# Patient Record
Sex: FEMALE | Race: ASIAN | NOT HISPANIC OR LATINO | URBAN - METROPOLITAN AREA
[De-identification: names, ages, dates, MRNs, and addresses within clinical notes are randomized per-mention and may not be internally consistent; named-entity substitution may affect disease eponyms.]

---

## 2021-11-26 ENCOUNTER — EMERGENCY (EMERGENCY)
Facility: HOSPITAL | Age: 61
LOS: 1 days | Discharge: HOME | End: 2021-11-26
Attending: EMERGENCY MEDICINE | Admitting: EMERGENCY MEDICINE
Payer: COMMERCIAL

## 2021-11-26 VITALS
RESPIRATION RATE: 19 BRPM | DIASTOLIC BLOOD PRESSURE: 78 MMHG | OXYGEN SATURATION: 96 % | HEART RATE: 84 BPM | SYSTOLIC BLOOD PRESSURE: 136 MMHG

## 2021-11-26 VITALS
HEART RATE: 80 BPM | TEMPERATURE: 99 F | WEIGHT: 175.05 LBS | DIASTOLIC BLOOD PRESSURE: 84 MMHG | OXYGEN SATURATION: 95 % | RESPIRATION RATE: 18 BRPM | SYSTOLIC BLOOD PRESSURE: 140 MMHG | HEIGHT: 62 IN

## 2021-11-26 LAB
ALBUMIN SERPL ELPH-MCNC: 3.8 G/DL — SIGNIFICANT CHANGE UP (ref 3.3–5)
ALP SERPL-CCNC: 82 U/L — SIGNIFICANT CHANGE UP (ref 40–120)
ALT FLD-CCNC: 31 U/L — SIGNIFICANT CHANGE UP (ref 12–78)
ANION GAP SERPL CALC-SCNC: 3 MMOL/L — LOW (ref 5–17)
APPEARANCE UR: CLEAR — SIGNIFICANT CHANGE UP
AST SERPL-CCNC: 14 U/L — LOW (ref 15–37)
BASOPHILS # BLD AUTO: 0.07 K/UL — SIGNIFICANT CHANGE UP (ref 0–0.2)
BASOPHILS NFR BLD AUTO: 0.8 % — SIGNIFICANT CHANGE UP (ref 0–2)
BILIRUB SERPL-MCNC: 0.6 MG/DL — SIGNIFICANT CHANGE UP (ref 0.2–1.2)
BILIRUB UR-MCNC: NEGATIVE — SIGNIFICANT CHANGE UP
BUN SERPL-MCNC: 10 MG/DL — SIGNIFICANT CHANGE UP (ref 7–23)
CALCIUM SERPL-MCNC: 10.4 MG/DL — HIGH (ref 8.5–10.1)
CHLORIDE SERPL-SCNC: 111 MMOL/L — HIGH (ref 96–108)
CO2 SERPL-SCNC: 29 MMOL/L — SIGNIFICANT CHANGE UP (ref 22–31)
COLOR SPEC: YELLOW — SIGNIFICANT CHANGE UP
CREAT SERPL-MCNC: 0.77 MG/DL — SIGNIFICANT CHANGE UP (ref 0.5–1.3)
DIFF PNL FLD: NEGATIVE — SIGNIFICANT CHANGE UP
EOSINOPHIL # BLD AUTO: 0.18 K/UL — SIGNIFICANT CHANGE UP (ref 0–0.5)
EOSINOPHIL NFR BLD AUTO: 2 % — SIGNIFICANT CHANGE UP (ref 0–6)
GLUCOSE SERPL-MCNC: 101 MG/DL — HIGH (ref 70–99)
GLUCOSE UR QL: NEGATIVE — SIGNIFICANT CHANGE UP
HCT VFR BLD CALC: 43.8 % — SIGNIFICANT CHANGE UP (ref 34.5–45)
HGB BLD-MCNC: 14.8 G/DL — SIGNIFICANT CHANGE UP (ref 11.5–15.5)
IMM GRANULOCYTES NFR BLD AUTO: 0.8 % — SIGNIFICANT CHANGE UP (ref 0–1.5)
KETONES UR-MCNC: NEGATIVE — SIGNIFICANT CHANGE UP
LEUKOCYTE ESTERASE UR-ACNC: NEGATIVE — SIGNIFICANT CHANGE UP
LYMPHOCYTES # BLD AUTO: 2.64 K/UL — SIGNIFICANT CHANGE UP (ref 1–3.3)
LYMPHOCYTES # BLD AUTO: 29.5 % — SIGNIFICANT CHANGE UP (ref 13–44)
MCHC RBC-ENTMCNC: 30.3 PG — SIGNIFICANT CHANGE UP (ref 27–34)
MCHC RBC-ENTMCNC: 33.8 GM/DL — SIGNIFICANT CHANGE UP (ref 32–36)
MCV RBC AUTO: 89.8 FL — SIGNIFICANT CHANGE UP (ref 80–100)
MONOCYTES # BLD AUTO: 0.54 K/UL — SIGNIFICANT CHANGE UP (ref 0–0.9)
MONOCYTES NFR BLD AUTO: 6 % — SIGNIFICANT CHANGE UP (ref 2–14)
NEUTROPHILS # BLD AUTO: 5.45 K/UL — SIGNIFICANT CHANGE UP (ref 1.8–7.4)
NEUTROPHILS NFR BLD AUTO: 60.9 % — SIGNIFICANT CHANGE UP (ref 43–77)
NITRITE UR-MCNC: NEGATIVE — SIGNIFICANT CHANGE UP
NRBC # BLD: 0 /100 WBCS — SIGNIFICANT CHANGE UP (ref 0–0)
PH UR: 8 — SIGNIFICANT CHANGE UP (ref 5–8)
PLATELET # BLD AUTO: 314 K/UL — SIGNIFICANT CHANGE UP (ref 150–400)
POTASSIUM SERPL-MCNC: 3.8 MMOL/L — SIGNIFICANT CHANGE UP (ref 3.5–5.3)
POTASSIUM SERPL-SCNC: 3.8 MMOL/L — SIGNIFICANT CHANGE UP (ref 3.5–5.3)
PROT SERPL-MCNC: 7.3 G/DL — SIGNIFICANT CHANGE UP (ref 6–8.3)
PROT UR-MCNC: NEGATIVE — SIGNIFICANT CHANGE UP
RBC # BLD: 4.88 M/UL — SIGNIFICANT CHANGE UP (ref 3.8–5.2)
RBC # FLD: 13.6 % — SIGNIFICANT CHANGE UP (ref 10.3–14.5)
SODIUM SERPL-SCNC: 143 MMOL/L — SIGNIFICANT CHANGE UP (ref 135–145)
SP GR SPEC: 1.01 — SIGNIFICANT CHANGE UP (ref 1.01–1.02)
UROBILINOGEN FLD QL: NEGATIVE — SIGNIFICANT CHANGE UP
WBC # BLD: 8.95 K/UL — SIGNIFICANT CHANGE UP (ref 3.8–10.5)
WBC # FLD AUTO: 8.95 K/UL — SIGNIFICANT CHANGE UP (ref 3.8–10.5)

## 2021-11-26 PROCEDURE — 87086 URINE CULTURE/COLONY COUNT: CPT

## 2021-11-26 PROCEDURE — 81003 URINALYSIS AUTO W/O SCOPE: CPT

## 2021-11-26 PROCEDURE — 72110 X-RAY EXAM L-2 SPINE 4/>VWS: CPT

## 2021-11-26 PROCEDURE — 72110 X-RAY EXAM L-2 SPINE 4/>VWS: CPT | Mod: 26

## 2021-11-26 PROCEDURE — 96374 THER/PROPH/DIAG INJ IV PUSH: CPT

## 2021-11-26 PROCEDURE — 80053 COMPREHEN METABOLIC PANEL: CPT

## 2021-11-26 PROCEDURE — 99284 EMERGENCY DEPT VISIT MOD MDM: CPT | Mod: 25

## 2021-11-26 PROCEDURE — 85025 COMPLETE CBC W/AUTO DIFF WBC: CPT

## 2021-11-26 PROCEDURE — 96361 HYDRATE IV INFUSION ADD-ON: CPT

## 2021-11-26 PROCEDURE — 99284 EMERGENCY DEPT VISIT MOD MDM: CPT

## 2021-11-26 PROCEDURE — 36415 COLL VENOUS BLD VENIPUNCTURE: CPT

## 2021-11-26 RX ORDER — LIDOCAINE 4 G/100G
1 CREAM TOPICAL ONCE
Refills: 0 | Status: COMPLETED | OUTPATIENT
Start: 2021-11-26 | End: 2021-11-26

## 2021-11-26 RX ORDER — SODIUM CHLORIDE 9 MG/ML
1000 INJECTION INTRAMUSCULAR; INTRAVENOUS; SUBCUTANEOUS ONCE
Refills: 0 | Status: COMPLETED | OUTPATIENT
Start: 2021-11-26 | End: 2021-11-26

## 2021-11-26 RX ORDER — KETOROLAC TROMETHAMINE 30 MG/ML
30 SYRINGE (ML) INJECTION ONCE
Refills: 0 | Status: DISCONTINUED | OUTPATIENT
Start: 2021-11-26 | End: 2021-11-26

## 2021-11-26 RX ORDER — METHOCARBAMOL 500 MG/1
2 TABLET, FILM COATED ORAL
Qty: 18 | Refills: 0
Start: 2021-11-26 | End: 2021-11-28

## 2021-11-26 RX ADMIN — Medication 30 MILLIGRAM(S): at 19:15

## 2021-11-26 RX ADMIN — Medication 30 MILLIGRAM(S): at 18:45

## 2021-11-26 RX ADMIN — LIDOCAINE 1 PATCH: 4 CREAM TOPICAL at 19:15

## 2021-11-26 RX ADMIN — SODIUM CHLORIDE 1000 MILLILITER(S): 9 INJECTION INTRAMUSCULAR; INTRAVENOUS; SUBCUTANEOUS at 20:16

## 2021-11-26 RX ADMIN — LIDOCAINE 1 PATCH: 4 CREAM TOPICAL at 18:45

## 2021-11-26 RX ADMIN — SODIUM CHLORIDE 1000 MILLILITER(S): 9 INJECTION INTRAMUSCULAR; INTRAVENOUS; SUBCUTANEOUS at 18:45

## 2021-11-26 NOTE — ED PROVIDER NOTE - PATIENT PORTAL LINK FT
You can access the FollowMyHealth Patient Portal offered by [facility] by registering at the following website: [email or web address]. By joining [employer]’s FollowMyHealth portal, you will also be able to view your health information using other applications (apps) compatible with our system.

## 2021-11-26 NOTE — ED PROVIDER NOTE - NSFOLLOWUPINSTRUCTIONS_ED_ALL_ED_FT
Naproxen and robaxin as prescribed. Be advised robaxen can cause drowsiness.  Can continue biofreeze or use lidocaine patches (over the counter).  Avoid heavy lifting or strenuous activity.  Follow up with Dr. Thomson.  Return to Emergency Department for worsening or concerning symptoms.    -----------------------------      Acute Low Back Pain    WHAT YOU NEED TO KNOW:    Acute low back pain is sudden discomfort that lasts up to 6 weeks and makes activity difficult.    DISCHARGE INSTRUCTIONS:    Return to the emergency department if:   •You have severe pain.      •You have sudden stiffness and heaviness on both buttocks down to both legs.      •You have numbness or weakness in one leg, or pain in both legs.      •You have numbness in your genital area or across your lower back.      •You cannot control your urine or bowel movements.      Call your doctor if:   •You have a fever.      •You have pain at night or when you rest.      •Your pain does not get better with treatment.      •You have pain that worsens when you cough or sneeze.      •You suddenly feel something pop or snap in your back.      •You have questions or concerns about your condition or care.      Medicines: You may need any of the following:  •NSAIDs, such as ibuprofen, help decrease swelling, pain, and fever. This medicine is available with or without a doctor's order. NSAIDs can cause stomach bleeding or kidney problems in certain people. If you take blood thinner medicine, always ask your healthcare provider if NSAIDs are safe for you. Always read the medicine label and follow directions.      •Acetaminophen decreases pain and fever. It is available without a doctor's order. Ask how much to take and how often to take it. Follow directions. Read the labels of all other medicines you are using to see if they also contain acetaminophen, or ask your doctor or pharmacist. Acetaminophen can cause liver damage if not taken correctly. Do not use more than 4 grams (4,000 milligrams) total of acetaminophen in one day.       •Muscle relaxers decrease pain by relaxing the muscles in your lower spine.      •Prescription pain medicine may be given. Ask your healthcare provider how to take this medicine safely. Some prescription pain medicines contain acetaminophen. Do not take other medicines that contain acetaminophen without talking to your healthcare provider. Too much acetaminophen may cause liver damage. Prescription pain medicine may cause constipation. Ask your healthcare provider how to prevent or treat constipation.       Self-care:   •Stay active as much as you can without causing more pain. Bed rest could make your back pain worse. Start with some light exercises, such as walking. Avoid heavy lifting until your pain is gone. Ask for more information about the activities or exercises that are right for you.   Family Walking for Exercise           •Apply heat on your back for 20 to 30 minutes every 2 hours for as many days as directed. Heat helps decrease pain and muscle spasms. Alternate heat and ice.      •Apply ice on your back for 15 to 20 minutes every hour or as directed. Use an ice pack, or put crushed ice in a plastic bag. Cover it with a towel before you apply it to your skin. Ice helps prevent tissue damage and decreases swelling and pain.      Prevent acute low back pain:   •Use proper body mechanics. ?Bend at the hips and knees when you  objects. Do not bend from the waist. Use your leg muscles as you lift the load. Do not use your back. Keep the object close to your chest as you lift it. Try not to twist or lift anything above your waist.      ?Change your position often when you stand for long periods of time. Rest one foot on a small box or footrest, and then switch to the other foot often.      ?Try not to sit for long periods of time. When you do, sit in a straight-backed chair with your feet flat on the floor. Never reach, pull, or push while you are sitting.      •Do exercises that strengthen your back muscles. Warm up before you exercise. Ask your healthcare provider the best exercises for you.      •Maintain a healthy weight. Ask your healthcare provider what a healthy weight is for you. Ask him or her to help you create a weight loss plan if you are overweight.      Follow up with your doctor as directed: Return for a follow-up visit if you still have pain after 1 to 3 weeks of treatment. You may need to visit an orthopedist if your back pain lasts longer than 12 weeks. Write down your questions so you remember to ask them during your visits.

## 2021-11-26 NOTE — ED PROVIDER NOTE - PROGRESS NOTE DETAILS
Reevaluated patient at bedside.  Patient feeling well. Discussed the results of all diagnostic testing in ED. An opportunity to ask questions was provided.  Discussed the importance of prompt, close medical follow-up.  Patient will return with any changes, concerns or persistent/worsening symptoms.  Understanding of all instructions verbalized.

## 2021-11-26 NOTE — ED PROVIDER NOTE - OBJECTIVE STATEMENT
62 y/o f denies pmhx c/o low back pain since Wednesday. Reports for at least the past week pt has been moving so has been doing some lifting, bending, up and down ladders. Denies 60 y/o f denies pmhx c/o low back pain since Wednesday. Reports for at least the past week pt has been moving so has been doing some lifting, bending, up and down ladders. Denies very heavy lifting, fall, trauma. Also reports sometimes has dark urine, not every time she urinates, at beginning of stream. Denies fever, CP, SOB, abd pain, nausea, vomiting, bowel/bladder incontinence or retention, saddle anesthesia, numbness, weakness. Pain worse with movement. Has taken aleve and then advil with mild relief. Also biofreeze topical.

## 2021-11-26 NOTE — ED PROVIDER NOTE - CARE PROVIDER_API CALL
Meliza Thomson)  Internal Medicine  [address] [place] [address]  Phone: [phone]  Fax: [phone]  Follow Up Time:

## 2021-11-26 NOTE — ED PROVIDER NOTE - MUSCULOSKELETAL, MLM
Neck FROM, supple, nontender midline. Back: No cvat; no midline ttp; paraspinal ttp; pain worse with movements

## 2021-11-26 NOTE — ED ADULT NURSE NOTE - OBJECTIVE STATEMENT
Pt presents to the ED s/p low back pain x 3 days. Pt states' I moved into my new house and was lifting boxes from the basement.". Pt denies weakness, decrease sensation to the lower extremities.

## 2021-11-26 NOTE — ED PROVIDER NOTE - ATTENDING CONTRIBUTION TO CARE
61 female presents to ER with  c/o low back pain, non radiating, no fever, no bowel or bladder dysfunction, no fall, patient states she was moving boxes on Wednesday and started to have back pain, worse with movement, patient alert and oriented, abdomen soft, non tender, negative straight leg test, able to ambulate with out assistance, tender over paraspinal lumbar sacral area to palpation, f/u xrays, labs, ua, pain control, re-eval.

## 2021-11-26 NOTE — ED PROVIDER NOTE - CLINICAL SUMMARY MEDICAL DECISION MAKING FREE TEXT BOX
61 F no reported hx with low back pain, doing a lot of bending, lifting, etc; also intermittent dark urine - no midline tenderness, +paraspinal tenderness - xray, meds, labs, UA

## 2021-11-26 NOTE — ED ADULT NURSE NOTE - DISTAL EXTREMITY COLOR
Stroke risk factor  SBP <220   Holding home antihypertensives due to concern for acute stroke  Prn labetalol   color consistent with ethnicity/race

## 2021-11-27 LAB
CULTURE RESULTS: SIGNIFICANT CHANGE UP
SPECIMEN SOURCE: SIGNIFICANT CHANGE UP

## 2024-07-17 ENCOUNTER — RX ONLY (RX ONLY)
Age: 64
End: 2024-07-17

## 2024-07-17 ENCOUNTER — OFFICE (OUTPATIENT)
Dept: URBAN - METROPOLITAN AREA CLINIC 70 | Facility: CLINIC | Age: 64
Setting detail: OPHTHALMOLOGY
End: 2024-07-17
Payer: MEDICAID

## 2024-07-17 DIAGNOSIS — H25.13: ICD-10-CM

## 2024-07-17 DIAGNOSIS — H35.361: ICD-10-CM

## 2024-07-17 DIAGNOSIS — H52.4: ICD-10-CM

## 2024-07-17 DIAGNOSIS — H40.013: ICD-10-CM

## 2024-07-17 DIAGNOSIS — H16.223: ICD-10-CM

## 2024-07-17 PROCEDURE — 92004 COMPRE OPH EXAM NEW PT 1/>: CPT | Performed by: STUDENT IN AN ORGANIZED HEALTH CARE EDUCATION/TRAINING PROGRAM

## 2024-07-17 PROCEDURE — 92015 DETERMINE REFRACTIVE STATE: CPT | Performed by: STUDENT IN AN ORGANIZED HEALTH CARE EDUCATION/TRAINING PROGRAM

## 2024-07-17 PROCEDURE — 92250 FUNDUS PHOTOGRAPHY W/I&R: CPT | Performed by: STUDENT IN AN ORGANIZED HEALTH CARE EDUCATION/TRAINING PROGRAM

## 2024-07-17 ASSESSMENT — LID EXAM ASSESSMENTS
OS_MEIBOMITIS: 2+
OD_MEIBOMITIS: 2+

## 2024-07-17 ASSESSMENT — CONFRONTATIONAL VISUAL FIELD TEST (CVF)
OD_FINDINGS: FULL
OS_FINDINGS: FULL

## 2024-11-07 NOTE — ED PROVIDER NOTE - CPE EDP GASTRO NORM
[FreeTextEntry3] : I, Ml Feliciano solely acted as scribe for Dr. Janice Castellon on 11/04/2024  All medical entries made by the Scribe were at my, Dr. Cutler, direction and personally dictated by me on 11/04/2024 . I have reviewed the chart and agree that the record accurately reflects my personal performance of the history, physical exam, assessment and plan. I have also personally directed, reviewed, and agreed with the chart.
[FreeTextEntry3] : I, Ml Feliciano solely acted as scribe for Dr. Janice Castellon on 11/04/2024  All medical entries made by the Scribe were at my, Dr. Cutler, direction and personally dictated by me on 11/04/2024 . I have reviewed the chart and agree that the record accurately reflects my personal performance of the history, physical exam, assessment and plan. I have also personally directed, reviewed, and agreed with the chart.
normal...

## 2024-11-08 ENCOUNTER — RX ONLY (RX ONLY)
Age: 64
End: 2024-11-08

## 2024-11-08 ENCOUNTER — OFFICE (OUTPATIENT)
Dept: URBAN - METROPOLITAN AREA CLINIC 70 | Facility: CLINIC | Age: 64
Setting detail: OPHTHALMOLOGY
End: 2024-11-08
Payer: MEDICAID

## 2024-11-08 DIAGNOSIS — H16.223: ICD-10-CM

## 2024-11-08 DIAGNOSIS — H40.013: ICD-10-CM

## 2024-11-08 PROCEDURE — 83861 MICROFLUID ANALY TEARS: CPT | Mod: QW | Performed by: STUDENT IN AN ORGANIZED HEALTH CARE EDUCATION/TRAINING PROGRAM

## 2024-11-08 PROCEDURE — 92012 INTRM OPH EXAM EST PATIENT: CPT | Performed by: STUDENT IN AN ORGANIZED HEALTH CARE EDUCATION/TRAINING PROGRAM

## 2024-11-08 ASSESSMENT — KERATOMETRY
OD_K2POWER_DIOPTERS: 44.25
OS_AXISANGLE_DEGREES: 116
OS_K2POWER_DIOPTERS: 44.50
OS_K1POWER_DIOPTERS: 44.25
OD_K1POWER_DIOPTERS: 43.75
OD_AXISANGLE_DEGREES: 082

## 2024-11-08 ASSESSMENT — CONFRONTATIONAL VISUAL FIELD TEST (CVF)
OD_FINDINGS: FULL
OS_FINDINGS: FULL

## 2024-11-08 ASSESSMENT — TEAR BREAK UP TIME (TBUT)
OD_TBUT: 3 SECONDS
OS_TBUT: 3 SECONDS

## 2024-11-08 ASSESSMENT — LID EXAM ASSESSMENTS
OS_MEIBOMITIS: 2+
OD_MEIBOMITIS: 2+

## 2024-11-08 ASSESSMENT — SUPERFICIAL PUNCTATE KERATITIS (SPK)
OD_SPK: T
OS_SPK: T

## 2024-11-08 ASSESSMENT — REFRACTION_MANIFEST
OS_SPHERE: +2.25
OD_SPHERE: +1.75
OS_CYLINDER: SPH
OS_VA1: 20/25
OD_ADD: +2.50
OS_ADD: +2.50
OD_VA1: 20/20
OD_CYLINDER: SPH

## 2024-11-08 ASSESSMENT — REFRACTION_AUTOREFRACTION
OD_SPHERE: +1.75
OS_SPHERE: +2.50
OS_AXIS: 062
OD_AXIS: 000
OS_CYLINDER: -0.25
OD_CYLINDER: 0.00

## 2024-11-08 ASSESSMENT — REFRACTION_CURRENTRX
OD_AXIS: 180
OD_OVR_VA: 20/
OD_VPRISM_DIRECTION: SV
OS_SPHERE: +2.75
OS_VPRISM_DIRECTION: SV
OS_OVR_VA: 20/
OD_SPHERE: +2.75
OS_AXIS: 180
OS_CYLINDER: SPH
OD_CYLINDER: SPH

## 2024-11-08 ASSESSMENT — VISUAL ACUITY
OD_BCVA: 20/40
OS_BCVA: 20/50-2